# Patient Record
Sex: MALE | Race: WHITE | NOT HISPANIC OR LATINO | Employment: UNEMPLOYED | URBAN - NONMETROPOLITAN AREA
[De-identification: names, ages, dates, MRNs, and addresses within clinical notes are randomized per-mention and may not be internally consistent; named-entity substitution may affect disease eponyms.]

---

## 2020-11-24 ENCOUNTER — OFFICE VISIT (OUTPATIENT)
Dept: UROLOGY | Facility: CLINIC | Age: 30
End: 2020-11-24

## 2020-11-24 VITALS — WEIGHT: 164 LBS | BODY MASS INDEX: 23.48 KG/M2 | HEIGHT: 70 IN | TEMPERATURE: 98.1 F

## 2020-11-24 DIAGNOSIS — R53.83 LETHARGY: Primary | ICD-10-CM

## 2020-11-24 DIAGNOSIS — R68.82 LOSS OF LIBIDO: ICD-10-CM

## 2020-11-24 PROCEDURE — 83002 ASSAY OF GONADOTROPIN (LH): CPT | Performed by: UROLOGY

## 2020-11-24 PROCEDURE — 36415 COLL VENOUS BLD VENIPUNCTURE: CPT | Performed by: UROLOGY

## 2020-11-24 PROCEDURE — 80076 HEPATIC FUNCTION PANEL: CPT | Performed by: UROLOGY

## 2020-11-24 PROCEDURE — 84443 ASSAY THYROID STIM HORMONE: CPT | Performed by: UROLOGY

## 2020-11-24 PROCEDURE — 99203 OFFICE O/P NEW LOW 30 MIN: CPT | Performed by: UROLOGY

## 2020-11-24 PROCEDURE — 84403 ASSAY OF TOTAL TESTOSTERONE: CPT | Performed by: UROLOGY

## 2020-11-24 PROCEDURE — 84146 ASSAY OF PROLACTIN: CPT | Performed by: UROLOGY

## 2020-11-24 PROCEDURE — 85027 COMPLETE CBC AUTOMATED: CPT | Performed by: UROLOGY

## 2020-11-24 PROCEDURE — 83001 ASSAY OF GONADOTROPIN (FSH): CPT | Performed by: UROLOGY

## 2020-11-24 RX ORDER — BUPRENORPHINE HYDROCHLORIDE AND NALOXONE HYDROCHLORIDE DIHYDRATE 8; 2 MG/1; MG/1
1 TABLET SUBLINGUAL DAILY
COMMUNITY

## 2020-11-24 NOTE — PROGRESS NOTES
Chief Complaint:          Lethargy and loss of libido    HPI:   30 y.o. male.  Pt has 6 to 7 month history of emotional lability and loss of energy and loss of sex drive.  Pt does not have erectile dysfunction but he has difficulty having organism.    HPI      Past Medical History:      History reviewed. No pertinent past medical history.      Current Meds:     Current Outpatient Medications   Medication Sig Dispense Refill   • buprenorphine-naloxone (SUBOXONE) 8-2 MG per SL tablet Place 1 tablet under the tongue Daily.       No current facility-administered medications for this visit.         Allergies:      No Known Allergies     Past Surgical History:     Past Surgical History:   Procedure Laterality Date   • KNEE SURGERY Left    • TONSILLECTOMY           Social History:     Social History     Socioeconomic History   • Marital status: Single     Spouse name: Not on file   • Number of children: Not on file   • Years of education: Not on file   • Highest education level: Not on file   Tobacco Use   • Smoking status: Heavy Tobacco Smoker     Packs/day: 1.00     Types: Cigarettes   • Smokeless tobacco: Never Used   Substance and Sexual Activity   • Alcohol use: Yes   • Drug use: Not Currently   • Sexual activity: Defer       Family History:     Family History   Problem Relation Age of Onset   • No Known Problems Father    • No Known Problems Mother        Review of Systems:     Review of Systems   Constitutional: Positive for fatigue. Negative for chills and fever.   HENT: Negative for congestion, sinus pressure and sinus pain.    Eyes: Negative for pain and redness.   Respiratory: Negative for chest tightness and shortness of breath.    Cardiovascular: Negative for chest pain.   Gastrointestinal: Negative for abdominal pain, constipation, diarrhea, nausea and vomiting.   Genitourinary: Negative for dysuria, flank pain, frequency, hematuria, penile pain, scrotal swelling and urgency.   Musculoskeletal: Negative for  "back pain and neck pain.   Skin: Negative for rash.   Allergic/Immunologic: Negative for food allergies.   Neurological: Negative for dizziness and headaches.   Hematological: Does not bruise/bleed easily.   Psychiatric/Behavioral: The patient is nervous/anxious.        Physical Exam:     Physical Exam  Vitals signs and nursing note reviewed.   HENT:      Head: Normocephalic and atraumatic.      Right Ear: External ear normal.      Left Ear: External ear normal.      Nose: Nose normal.   Eyes:      Conjunctiva/sclera: Conjunctivae normal.      Pupils: Pupils are equal, round, and reactive to light.   Neck:      Musculoskeletal: Normal range of motion and neck supple.      Thyroid: No thyromegaly.   Cardiovascular:      Rate and Rhythm: Normal rate and regular rhythm.      Heart sounds: Normal heart sounds. No murmur.   Pulmonary:      Effort: Pulmonary effort is normal. No respiratory distress.      Breath sounds: Normal breath sounds. No wheezing or rales.   Chest:      Chest wall: No tenderness.   Abdominal:      General: Bowel sounds are normal. There is no distension.      Palpations: Abdomen is soft. There is no mass.      Tenderness: There is no abdominal tenderness.      Hernia: No hernia is present.   Genitourinary:     Penis: Normal.    Musculoskeletal: Normal range of motion.         General: No tenderness.   Lymphadenopathy:      Cervical: No cervical adenopathy.   Skin:     General: Skin is warm.      Findings: No rash.   Neurological:      Mental Status: He is alert and oriented to person, place, and time.      Cranial Nerves: No cranial nerve deficit.      Motor: No abnormal muscle tone.      Coordination: Coordination normal.   Psychiatric:         Behavior: Behavior normal.         Thought Content: Thought content normal.         Judgment: Judgment normal.         Temp 98.1 °F (36.7 °C)   Ht 177.8 cm (70\")   Wt 74.4 kg (164 lb)   BMI 23.53 kg/m²    Procedure:         Assessment:     Encounter " Diagnoses   Name Primary?   • Lethargy Yes   • Loss of libido        Orders Placed This Encounter   Procedures   • Testosterone   • Hepatic Function Panel     Standing Status:   Future     Standing Expiration Date:   11/24/2021   • Luteinizing Hormone     Standing Status:   Future     Standing Expiration Date:   11/24/2021   • Prolactin     Standing Status:   Future     Standing Expiration Date:   11/24/2021   • Follicle Stimulating Hormone     Standing Status:   Future     Standing Expiration Date:   11/24/2021   • TSH     Standing Status:   Future     Standing Expiration Date:   11/24/2021       Patient reports that he is not currently experiencing any symptoms of urinary incontinence.      Plan:   Will check pituitary gonadal axis return 1 month    Patient's Body mass index is 23.53 kg/m². BMI is within normal parameters. No follow-up required..      Smoking Cessation Counseling:  Former smoker.  Patient does not currently use any tobacco products.   Counseling was given to patient for the following topics impressions as follows: lethargy . The interim medical history and current results were reviewed.  A treatment plan with follow-up was made for Lethargy [R53.83]   This document has been electronically signed by Ted Yo MD November 24, 2020 11:52 EST

## 2020-11-25 ENCOUNTER — TELEPHONE (OUTPATIENT)
Dept: UROLOGY | Facility: CLINIC | Age: 30
End: 2020-11-25

## 2020-11-25 LAB
ALBUMIN SERPL-MCNC: 4.6 G/DL (ref 3.5–5.2)
ALP SERPL-CCNC: 77 U/L (ref 39–117)
ALT SERPL W P-5'-P-CCNC: 17 U/L (ref 1–41)
AST SERPL-CCNC: 25 U/L (ref 1–40)
BILIRUB CONJ SERPL-MCNC: <0.2 MG/DL (ref 0–0.3)
BILIRUB INDIRECT SERPL-MCNC: NORMAL MG/DL
BILIRUB SERPL-MCNC: 0.4 MG/DL (ref 0–1.2)
DEPRECATED RDW RBC AUTO: 41.9 FL (ref 37–54)
ERYTHROCYTE [DISTWIDTH] IN BLOOD BY AUTOMATED COUNT: 12.9 % (ref 12.3–15.4)
FSH SERPL-ACNC: 2.89 MIU/ML
HCT VFR BLD AUTO: 43.9 % (ref 37.5–51)
HGB BLD-MCNC: 15.1 G/DL (ref 13–17.7)
LH SERPL-ACNC: 1.6 MIU/ML
MCH RBC QN AUTO: 30.6 PG (ref 26.6–33)
MCHC RBC AUTO-ENTMCNC: 34.4 G/DL (ref 31.5–35.7)
MCV RBC AUTO: 88.9 FL (ref 79–97)
PLATELET # BLD AUTO: 136 10*3/MM3 (ref 140–450)
PMV BLD AUTO: 12.9 FL (ref 6–12)
PROLACTIN SERPL-MCNC: 8.76 NG/ML (ref 4.04–15.2)
PROT SERPL-MCNC: 7.2 G/DL (ref 6–8.5)
RBC # BLD AUTO: 4.94 10*6/MM3 (ref 4.14–5.8)
TESTOST SERPL-MCNC: 360 NG/DL (ref 249–836)
TSH SERPL DL<=0.05 MIU/L-ACNC: 2.2 UIU/ML (ref 0.27–4.2)
WBC # BLD AUTO: 5.61 10*3/MM3 (ref 3.4–10.8)

## 2020-11-25 NOTE — TELEPHONE ENCOUNTER
----- Message from Ted Yo MD sent at 11/25/2020  7:22 AM EST -----  Call pt and tell him that his testosterone is normal at 360,  Other labs are still pending  ----- Message -----  From: Lab, Background User  Sent: 11/25/2020   4:41 AM EST  To: Ted Yo MD

## 2020-12-28 ENCOUNTER — TELEPHONE (OUTPATIENT)
Dept: UROLOGY | Facility: CLINIC | Age: 30
End: 2020-12-28

## 2020-12-29 NOTE — TELEPHONE ENCOUNTER
Went over results with pt- all normal range - explained that is he is still having the sx of loss of libido and fatigue, that he needs to come in for an appt - had one yesterday that he no showed - transferred to [facility] to schedule.

## 2024-12-08 ENCOUNTER — HOSPITAL ENCOUNTER (INPATIENT)
Facility: HOSPITAL | Age: 34
LOS: 1 days | Discharge: HOME | DRG: 897 | End: 2024-12-09
Attending: PSYCHIATRY & NEUROLOGY | Admitting: PSYCHIATRY & NEUROLOGY
Payer: COMMERCIAL

## 2024-12-08 ENCOUNTER — HOSPITAL ENCOUNTER (EMERGENCY)
Facility: HOSPITAL | Age: 34
Discharge: PSYCHIATRIC OR SUBSTANCE USE FACILITY | DRG: 897 | End: 2024-12-08
Admitting: EMERGENCY MEDICINE
Payer: COMMERCIAL

## 2024-12-08 VITALS
BODY MASS INDEX: 21.05 KG/M2 | HEART RATE: 68 BPM | DIASTOLIC BLOOD PRESSURE: 82 MMHG | RESPIRATION RATE: 18 BRPM | SYSTOLIC BLOOD PRESSURE: 126 MMHG | OXYGEN SATURATION: 99 % | WEIGHT: 147 LBS | TEMPERATURE: 96.7 F | HEIGHT: 70 IN

## 2024-12-08 DIAGNOSIS — F19.10 SUBSTANCE ABUSE: Primary | ICD-10-CM

## 2024-12-08 PROBLEM — F19.20 CHEMICAL DEPENDENCY: Status: ACTIVE | Noted: 2024-12-08

## 2024-12-08 LAB
ALBUMIN SERPL-MCNC: 4.5 G/DL (ref 3.5–5.2)
ALBUMIN/GLOB SERPL: 1.6 G/DL
ALP SERPL-CCNC: 88 U/L (ref 39–117)
ALT SERPL W P-5'-P-CCNC: 32 U/L (ref 1–41)
AMPHET+METHAMPHET UR QL: NEGATIVE
AMPHETAMINES UR QL: NEGATIVE
ANION GAP SERPL CALCULATED.3IONS-SCNC: 10.2 MMOL/L (ref 5–15)
AST SERPL-CCNC: 33 U/L (ref 1–40)
BARBITURATES UR QL SCN: NEGATIVE
BASOPHILS # BLD AUTO: 0.03 10*3/MM3 (ref 0–0.2)
BASOPHILS NFR BLD AUTO: 0.3 % (ref 0–1.5)
BENZODIAZ UR QL SCN: NEGATIVE
BILIRUB SERPL-MCNC: <0.2 MG/DL (ref 0–1.2)
BILIRUB UR QL STRIP: NEGATIVE
BUN SERPL-MCNC: 19 MG/DL (ref 6–20)
BUN/CREAT SERPL: 22.9 (ref 7–25)
BUPRENORPHINE SERPL-MCNC: NEGATIVE NG/ML
CALCIUM SPEC-SCNC: 9.7 MG/DL (ref 8.6–10.5)
CANNABINOIDS SERPL QL: POSITIVE
CHLORIDE SERPL-SCNC: 106 MMOL/L (ref 98–107)
CLARITY UR: ABNORMAL
CO2 SERPL-SCNC: 26.8 MMOL/L (ref 22–29)
COCAINE UR QL: NEGATIVE
COLOR UR: YELLOW
CREAT SERPL-MCNC: 0.83 MG/DL (ref 0.76–1.27)
DEPRECATED RDW RBC AUTO: 42.6 FL (ref 37–54)
EGFRCR SERPLBLD CKD-EPI 2021: 117.1 ML/MIN/1.73
EOSINOPHIL # BLD AUTO: 0.24 10*3/MM3 (ref 0–0.4)
EOSINOPHIL NFR BLD AUTO: 2.7 % (ref 0.3–6.2)
ERYTHROCYTE [DISTWIDTH] IN BLOOD BY AUTOMATED COUNT: 12.3 % (ref 12.3–15.4)
ETHANOL BLD-MCNC: <10 MG/DL (ref 0–10)
ETHANOL UR QL: <0.01 %
FENTANYL UR-MCNC: NEGATIVE NG/ML
GLOBULIN UR ELPH-MCNC: 2.9 GM/DL
GLUCOSE SERPL-MCNC: 98 MG/DL (ref 65–99)
GLUCOSE UR STRIP-MCNC: NEGATIVE MG/DL
HCT VFR BLD AUTO: 43.2 % (ref 37.5–51)
HGB BLD-MCNC: 14.1 G/DL (ref 13–17.7)
HGB UR QL STRIP.AUTO: NEGATIVE
IMM GRANULOCYTES # BLD AUTO: 0.01 10*3/MM3 (ref 0–0.05)
IMM GRANULOCYTES NFR BLD AUTO: 0.1 % (ref 0–0.5)
KETONES UR QL STRIP: NEGATIVE
LEUKOCYTE ESTERASE UR QL STRIP.AUTO: NEGATIVE
LYMPHOCYTES # BLD AUTO: 2.83 10*3/MM3 (ref 0.7–3.1)
LYMPHOCYTES NFR BLD AUTO: 32.3 % (ref 19.6–45.3)
MAGNESIUM SERPL-MCNC: 2 MG/DL (ref 1.6–2.6)
MCH RBC QN AUTO: 30.9 PG (ref 26.6–33)
MCHC RBC AUTO-ENTMCNC: 32.6 G/DL (ref 31.5–35.7)
MCV RBC AUTO: 94.7 FL (ref 79–97)
METHADONE UR QL SCN: NEGATIVE
MONOCYTES # BLD AUTO: 0.86 10*3/MM3 (ref 0.1–0.9)
MONOCYTES NFR BLD AUTO: 9.8 % (ref 5–12)
NEUTROPHILS NFR BLD AUTO: 4.78 10*3/MM3 (ref 1.7–7)
NEUTROPHILS NFR BLD AUTO: 54.8 % (ref 42.7–76)
NITRITE UR QL STRIP: NEGATIVE
NRBC BLD AUTO-RTO: 0 /100 WBC (ref 0–0.2)
OPIATES UR QL: NEGATIVE
OXYCODONE UR QL SCN: NEGATIVE
PCP UR QL SCN: NEGATIVE
PH UR STRIP.AUTO: 6.5 [PH] (ref 5–8)
PLATELET # BLD AUTO: 209 10*3/MM3 (ref 140–450)
PMV BLD AUTO: 10.7 FL (ref 6–12)
POTASSIUM SERPL-SCNC: 4.1 MMOL/L (ref 3.5–5.2)
PROT SERPL-MCNC: 7.4 G/DL (ref 6–8.5)
PROT UR QL STRIP: ABNORMAL
RBC # BLD AUTO: 4.56 10*6/MM3 (ref 4.14–5.8)
SODIUM SERPL-SCNC: 143 MMOL/L (ref 136–145)
SP GR UR STRIP: 1.02 (ref 1–1.03)
TRICYCLICS UR QL SCN: NEGATIVE
UROBILINOGEN UR QL STRIP: ABNORMAL
WBC NRBC COR # BLD AUTO: 8.75 10*3/MM3 (ref 3.4–10.8)

## 2024-12-08 PROCEDURE — 80053 COMPREHEN METABOLIC PANEL: CPT | Performed by: PHYSICIAN ASSISTANT

## 2024-12-08 PROCEDURE — 36415 COLL VENOUS BLD VENIPUNCTURE: CPT

## 2024-12-08 PROCEDURE — 85025 COMPLETE CBC W/AUTO DIFF WBC: CPT | Performed by: PHYSICIAN ASSISTANT

## 2024-12-08 PROCEDURE — 93005 ELECTROCARDIOGRAM TRACING: CPT | Performed by: EMERGENCY MEDICINE

## 2024-12-08 PROCEDURE — 81003 URINALYSIS AUTO W/O SCOPE: CPT | Performed by: PHYSICIAN ASSISTANT

## 2024-12-08 PROCEDURE — 99285 EMERGENCY DEPT VISIT HI MDM: CPT

## 2024-12-08 PROCEDURE — 83735 ASSAY OF MAGNESIUM: CPT | Performed by: PHYSICIAN ASSISTANT

## 2024-12-08 PROCEDURE — 80307 DRUG TEST PRSMV CHEM ANLYZR: CPT | Performed by: PHYSICIAN ASSISTANT

## 2024-12-08 PROCEDURE — 82077 ASSAY SPEC XCP UR&BREATH IA: CPT | Performed by: PHYSICIAN ASSISTANT

## 2024-12-08 PROCEDURE — HZ2ZZZZ DETOXIFICATION SERVICES FOR SUBSTANCE ABUSE TREATMENT: ICD-10-PCS | Performed by: PSYCHIATRY & NEUROLOGY

## 2024-12-08 PROCEDURE — 93010 ELECTROCARDIOGRAM REPORT: CPT | Performed by: INTERNAL MEDICINE

## 2024-12-08 RX ORDER — POLYETHYLENE GLYCOL 3350 17 G/17G
17 POWDER, FOR SOLUTION ORAL DAILY PRN
Status: DISCONTINUED | OUTPATIENT
Start: 2024-12-08 | End: 2024-12-09 | Stop reason: HOSPADM

## 2024-12-08 RX ORDER — CLONIDINE HYDROCHLORIDE 0.1 MG/1
0.1 TABLET ORAL 2 TIMES DAILY PRN
Status: DISCONTINUED | OUTPATIENT
Start: 2024-12-11 | End: 2024-12-09 | Stop reason: HOSPADM

## 2024-12-08 RX ORDER — ALUMINA, MAGNESIA, AND SIMETHICONE 2400; 2400; 240 MG/30ML; MG/30ML; MG/30ML
15 SUSPENSION ORAL EVERY 6 HOURS PRN
Status: DISCONTINUED | OUTPATIENT
Start: 2024-12-08 | End: 2024-12-09 | Stop reason: HOSPADM

## 2024-12-08 RX ORDER — CLONIDINE HYDROCHLORIDE 0.1 MG/1
0.1 TABLET ORAL 3 TIMES DAILY PRN
Status: DISCONTINUED | OUTPATIENT
Start: 2024-12-10 | End: 2024-12-09 | Stop reason: HOSPADM

## 2024-12-08 RX ORDER — ECHINACEA PURPUREA EXTRACT 125 MG
2 TABLET ORAL AS NEEDED
Status: DISCONTINUED | OUTPATIENT
Start: 2024-12-08 | End: 2024-12-09 | Stop reason: HOSPADM

## 2024-12-08 RX ORDER — HYDRALAZINE HYDROCHLORIDE 25 MG/1
25 TABLET, FILM COATED ORAL DAILY PRN
Status: DISCONTINUED | OUTPATIENT
Start: 2024-12-08 | End: 2024-12-09 | Stop reason: HOSPADM

## 2024-12-08 RX ORDER — HYDROXYZINE HYDROCHLORIDE 50 MG/1
50 TABLET, FILM COATED ORAL EVERY 6 HOURS PRN
Status: DISCONTINUED | OUTPATIENT
Start: 2024-12-08 | End: 2024-12-09 | Stop reason: HOSPADM

## 2024-12-08 RX ORDER — BENZTROPINE MESYLATE 1 MG/ML
1 INJECTION, SOLUTION INTRAMUSCULAR; INTRAVENOUS ONCE AS NEEDED
Status: DISCONTINUED | OUTPATIENT
Start: 2024-12-08 | End: 2024-12-09 | Stop reason: HOSPADM

## 2024-12-08 RX ORDER — CLONIDINE HYDROCHLORIDE 0.1 MG/1
0.1 TABLET ORAL 4 TIMES DAILY PRN
Status: DISCONTINUED | OUTPATIENT
Start: 2024-12-09 | End: 2024-12-09 | Stop reason: HOSPADM

## 2024-12-08 RX ORDER — OLANZAPINE 5 MG/1
2.5 TABLET ORAL ONCE
Status: DISCONTINUED | OUTPATIENT
Start: 2024-12-08 | End: 2024-12-09 | Stop reason: HOSPADM

## 2024-12-08 RX ORDER — ONDANSETRON 4 MG/1
4 TABLET, ORALLY DISINTEGRATING ORAL EVERY 6 HOURS PRN
Status: DISCONTINUED | OUTPATIENT
Start: 2024-12-08 | End: 2024-12-09 | Stop reason: HOSPADM

## 2024-12-08 RX ORDER — IBUPROFEN 400 MG/1
400 TABLET, FILM COATED ORAL EVERY 6 HOURS PRN
Status: DISCONTINUED | OUTPATIENT
Start: 2024-12-08 | End: 2024-12-09 | Stop reason: HOSPADM

## 2024-12-08 RX ORDER — FAMOTIDINE 20 MG/1
20 TABLET, FILM COATED ORAL 2 TIMES DAILY PRN
Status: DISCONTINUED | OUTPATIENT
Start: 2024-12-08 | End: 2024-12-09 | Stop reason: HOSPADM

## 2024-12-08 RX ORDER — CYCLOBENZAPRINE HCL 10 MG
10 TABLET ORAL 3 TIMES DAILY PRN
Status: DISCONTINUED | OUTPATIENT
Start: 2024-12-08 | End: 2024-12-09 | Stop reason: HOSPADM

## 2024-12-08 RX ORDER — LOPERAMIDE HYDROCHLORIDE 2 MG/1
2 CAPSULE ORAL
Status: DISCONTINUED | OUTPATIENT
Start: 2024-12-08 | End: 2024-12-09 | Stop reason: HOSPADM

## 2024-12-08 RX ORDER — TRAZODONE HYDROCHLORIDE 50 MG/1
50 TABLET, FILM COATED ORAL NIGHTLY PRN
Status: DISCONTINUED | OUTPATIENT
Start: 2024-12-08 | End: 2024-12-09 | Stop reason: HOSPADM

## 2024-12-08 RX ORDER — DICYCLOMINE HYDROCHLORIDE 10 MG/1
10 CAPSULE ORAL 3 TIMES DAILY PRN
Status: DISCONTINUED | OUTPATIENT
Start: 2024-12-08 | End: 2024-12-09 | Stop reason: HOSPADM

## 2024-12-08 RX ORDER — BENZTROPINE MESYLATE 1 MG/1
2 TABLET ORAL ONCE AS NEEDED
Status: DISCONTINUED | OUTPATIENT
Start: 2024-12-08 | End: 2024-12-09 | Stop reason: HOSPADM

## 2024-12-08 RX ORDER — CLONIDINE HYDROCHLORIDE 0.1 MG/1
0.1 TABLET ORAL ONCE AS NEEDED
Status: DISCONTINUED | OUTPATIENT
Start: 2024-12-12 | End: 2024-12-09 | Stop reason: HOSPADM

## 2024-12-08 RX ORDER — ACETAMINOPHEN 325 MG/1
650 TABLET ORAL EVERY 6 HOURS PRN
Status: DISCONTINUED | OUTPATIENT
Start: 2024-12-08 | End: 2024-12-09 | Stop reason: HOSPADM

## 2024-12-08 RX ORDER — CLONIDINE HYDROCHLORIDE 0.1 MG/1
0.1 TABLET ORAL 4 TIMES DAILY PRN
Status: ACTIVE | OUTPATIENT
Start: 2024-12-08 | End: 2024-12-09

## 2024-12-08 RX ORDER — NICOTINE 21 MG/24HR
1 PATCH, TRANSDERMAL 24 HOURS TRANSDERMAL
Status: DISCONTINUED | OUTPATIENT
Start: 2024-12-08 | End: 2024-12-09 | Stop reason: HOSPADM

## 2024-12-08 RX ORDER — BUPRENORPHINE AND NALOXONE 8; 2 MG/1; MG/1
0.12 FILM, SOLUBLE BUCCAL; SUBLINGUAL DAILY
COMMUNITY
End: 2024-12-09 | Stop reason: HOSPADM

## 2024-12-08 RX ORDER — BENZONATATE 100 MG/1
100 CAPSULE ORAL 3 TIMES DAILY PRN
Status: DISCONTINUED | OUTPATIENT
Start: 2024-12-08 | End: 2024-12-09 | Stop reason: HOSPADM

## 2024-12-08 RX ADMIN — TRAZODONE HYDROCHLORIDE 50 MG: 50 TABLET ORAL at 22:56

## 2024-12-08 RX ADMIN — HYDROXYZINE HYDROCHLORIDE 50 MG: 50 TABLET, FILM COATED ORAL at 22:58

## 2024-12-09 VITALS
WEIGHT: 143 LBS | HEART RATE: 61 BPM | RESPIRATION RATE: 18 BRPM | BODY MASS INDEX: 20.47 KG/M2 | HEIGHT: 70 IN | SYSTOLIC BLOOD PRESSURE: 133 MMHG | OXYGEN SATURATION: 98 % | TEMPERATURE: 98.6 F | DIASTOLIC BLOOD PRESSURE: 74 MMHG

## 2024-12-09 PROBLEM — F11.20 OPIOID USE DISORDER, SEVERE, DEPENDENCE: Status: ACTIVE | Noted: 2024-12-08

## 2024-12-09 LAB
HAV IGM SERPL QL IA: NORMAL
HBV CORE IGM SERPL QL IA: NORMAL
HBV SURFACE AG SERPL QL IA: NORMAL
HCV AB SER QL: NORMAL
QT INTERVAL: 384 MS
QTC INTERVAL: 399 MS

## 2024-12-09 PROCEDURE — 80074 ACUTE HEPATITIS PANEL: CPT | Performed by: PSYCHIATRY & NEUROLOGY

## 2024-12-09 PROCEDURE — 99235 HOSP IP/OBS SAME DATE MOD 70: CPT | Performed by: PSYCHIATRY & NEUROLOGY

## 2024-12-09 NOTE — ED PROVIDER NOTES
Subjective   History of Present Illness  34-year-old male presents secondary to need for detox from Suboxone.  Patient states he has been using this for a while.  He denies any suicidal homicidal ideation.  He denies any AV hallucinations.  He has no past medical problems.  He presents by private vehicle.      Review of Systems   Constitutional: Negative.  Negative for fever.   HENT: Negative.     Respiratory: Negative.     Cardiovascular: Negative.  Negative for chest pain.   Gastrointestinal: Negative.  Negative for abdominal pain.   Endocrine: Negative.    Genitourinary: Negative.  Negative for dysuria.   Skin: Negative.    Neurological: Negative.    Psychiatric/Behavioral: Negative.  Negative for suicidal ideas.    All other systems reviewed and are negative.      No past medical history on file.    No Known Allergies    Past Surgical History:   Procedure Laterality Date   • KNEE SURGERY Left    • TONSILLECTOMY         Family History   Problem Relation Age of Onset   • No Known Problems Father    • No Known Problems Mother        Social History     Socioeconomic History   • Marital status: Single   Tobacco Use   • Smoking status: Heavy Smoker     Current packs/day: 1.00     Types: Cigarettes   • Smokeless tobacco: Never   Substance and Sexual Activity   • Alcohol use: Yes   • Drug use: Not Currently   • Sexual activity: Defer           Objective   Physical Exam  Vitals and nursing note reviewed.   Constitutional:       General: He is not in acute distress.     Appearance: He is well-developed. He is not diaphoretic.   HENT:      Head: Normocephalic and atraumatic.      Right Ear: External ear normal.      Left Ear: External ear normal.      Nose: Nose normal.   Eyes:      Conjunctiva/sclera: Conjunctivae normal.      Pupils: Pupils are equal, round, and reactive to light.   Neck:      Vascular: No JVD.      Trachea: No tracheal deviation.   Cardiovascular:      Rate and Rhythm: Normal rate and regular rhythm.       Heart sounds: Normal heart sounds. No murmur heard.  Pulmonary:      Effort: Pulmonary effort is normal. No respiratory distress.      Breath sounds: Normal breath sounds. No wheezing.   Abdominal:      General: Bowel sounds are normal.      Palpations: Abdomen is soft.      Tenderness: There is no abdominal tenderness.   Musculoskeletal:         General: No deformity. Normal range of motion.      Cervical back: Normal range of motion and neck supple.   Skin:     General: Skin is warm and dry.      Coloration: Skin is not pale.      Findings: No erythema or rash.   Neurological:      Mental Status: He is alert and oriented to person, place, and time.      Cranial Nerves: No cranial nerve deficit.   Psychiatric:         Behavior: Behavior normal.         Thought Content: Thought content normal. Thought content does not include homicidal or suicidal ideation.         Procedures           ED Course  ED Course as of 12/16/24 0857   Sun Dec 08, 2024   2211 EKG was obtained reveals s sinus rhythm with a ventricular rate of 65, no acute ST change signify ischemia, right axis, ventricular rate 65  QRS 82 , QTc 399  Electronically signed by John Jackson MD, 12/08/24, 10:11 PM EST.   [AM]      ED Course User Index  [AM] John Jackson MD                                                       Medical Decision Making  34-year-old male presents secondary to need for detox from Suboxone.  Patient states he has been using this for a while.  He denies any suicidal homicidal ideation.  He denies any AV hallucinations.  He has no past medical problems.  He presents by private vehicle.    Problems Addressed:  Substance abuse: complicated acute illness or injury    Amount and/or Complexity of Data Reviewed  Labs: ordered. Decision-making details documented in ED Course.        Final diagnoses:   Substance abuse       ED Disposition  ED Disposition       ED Disposition   DC/Transfer to Behavioral Health     Condition   Stable    Comment   --               No follow-up provider specified.       Medication List      No changes were made to your prescriptions during this visit.            Demetris Hsu PA  12/08/24 1921       Demetris Hsu PA  12/16/24 0857

## 2024-12-09 NOTE — DISCHARGE SUMMARY
":  1990  MRN:  [id number]  Visit Number:  [id number]      Date of Admission:2024   Date of Discharge:  2024    Discharge Diagnosis:  Principal Problem:    Opioid use disorder, severe, dependence        Admission Diagnosis:  Chemical dependency [F19.20]     HENRY Mendez is a 34 y.o. male who was admitted on 2024 with complaints of opioid use and withdrawals.   For details please see H&P dated 5 history of abuse and withdrawals.  He was admitted to the detox unit.  2024    Hospital Course  Patient is a 34 y.o. male presented with patient was started on clonidine detox.  He reported that he had already tried to wean himself off of the Suboxone was started just 1 mg a day dose.  Patient was not experiencing any active withdrawals and reports that he would get uncomfortable at night.  Patient's condition did not merit an active detox treatment and he was encouraged to go to rehab treatment and he was agreeable and was then discharged.    Mental Status Exam upon discharge:   Mood \"better\"   Affect-congruent, appropriate, stable  Thought Content-goal directed, no delusional material present  Thought process-linear, organized.  Suicidality: No SI  Homicidality: No HI  Perception: No AH/VH    Procedures Performed         Consults:   Consults       No orders found for last 30 day(s).            Pertinent Test Results:   Admission on 2024   Component Date Value Ref Range Status    Hepatitis B Surface Ag 2024 Non-Reactive  Non-Reactive Final    Hep A IgM 2024 Non-Reactive  Non-Reactive Final    Hep B C IgM 2024 Non-Reactive  Non-Reactive Final    Hepatitis C Ab 2024 Non-Reactive  Non-Reactive Final   Admission on 2024, Discharged on 2024   Component Date Value Ref Range Status    Glucose 2024 98  65 - 99 mg/dL Final    BUN 2024 19  6 - 20 mg/dL Final    Creatinine 2024 0.83  0.76 - 1.27 mg/dL Final    Sodium 2024 143 "  136 - 145 mmol/L Final    Potassium 12/08/2024 4.1  3.5 - 5.2 mmol/L Final    Chloride 12/08/2024 106  98 - 107 mmol/L Final    CO2 12/08/2024 26.8  22.0 - 29.0 mmol/L Final    Calcium 12/08/2024 9.7  8.6 - 10.5 mg/dL Final    Total Protein 12/08/2024 7.4  6.0 - 8.5 g/dL Final    Albumin 12/08/2024 4.5  3.5 - 5.2 g/dL Final    ALT (SGPT) 12/08/2024 32  1 - 41 U/L Final    AST (SGOT) 12/08/2024 33  1 - 40 U/L Final    Alkaline Phosphatase 12/08/2024 88  39 - 117 U/L Final    Total Bilirubin 12/08/2024 <0.2  0.0 - 1.2 mg/dL Final    Globulin 12/08/2024 2.9  gm/dL Final    A/G Ratio 12/08/2024 1.6  g/dL Final    BUN/Creatinine Ratio 12/08/2024 22.9  7.0 - 25.0 Final    Anion Gap 12/08/2024 10.2  5.0 - 15.0 mmol/L Final    eGFR 12/08/2024 117.1  >60.0 mL/min/1.73 Final    Color, UA 12/08/2024 Yellow  Yellow, Straw Final    Appearance, UA 12/08/2024 Cloudy (A)  Clear Final    pH, UA 12/08/2024 6.5  5.0 - 8.0 Final    Specific [place], UA 12/08/2024 1.024  1.005 - 1.030 Final    Glucose, UA 12/08/2024 Negative  Negative Final    Ketones, UA 12/08/2024 Negative  Negative Final    Bilirubin, UA 12/08/2024 Negative  Negative Final    Blood, UA 12/08/2024 Negative  Negative Final    Protein, UA 12/08/2024 Trace (A)  Negative Final    Leuk Esterase, UA 12/08/2024 Negative  Negative Final    Nitrite, UA 12/08/2024 Negative  Negative Final    Urobilinogen, UA 12/08/2024 0.2 E.U./dL  0.2 - 1.0 E.U./dL Final    Ethanol 12/08/2024 <10  0 - 10 mg/dL Final    Ethanol % 12/08/2024 <0.010  % Final    THC, Screen, Urine 12/08/2024 Positive (A)  Negative Final    Phencyclidine (PCP), Urine 12/08/2024 Negative  Negative Final    Cocaine Screen, Urine 12/08/2024 Negative  Negative Final    Methamphetamine, Ur 12/08/2024 Negative  Negative Final    Opiate Screen 12/08/2024 Negative  Negative Final    Amphetamine Screen, Urine 12/08/2024 Negative  Negative Final    Benzodiazepine Screen, Urine 12/08/2024 Negative  Negative Final     Tricyclic Antidepressants Screen 12/08/2024 Negative  Negative Final    Methadone Screen, Urine 12/08/2024 Negative  Negative Final    Barbiturates Screen, Urine 12/08/2024 Negative  Negative Final    Oxycodone Screen, Urine 12/08/2024 Negative  Negative Final    Buprenorphine, Screen, Urine 12/08/2024 Negative  Negative Final    Magnesium 12/08/2024 2.0  1.6 - 2.6 mg/dL Final    WBC 12/08/2024 8.75  3.40 - 10.80 10*3/mm3 Final    RBC 12/08/2024 4.56  4.14 - 5.80 10*6/mm3 Final    Hemoglobin 12/08/2024 14.1  13.0 - 17.7 g/dL Final    Hematocrit 12/08/2024 43.2  37.5 - 51.0 % Final    MCV 12/08/2024 94.7  79.0 - 97.0 fL Final    MCH 12/08/2024 30.9  26.6 - 33.0 pg Final    MCHC 12/08/2024 32.6  31.5 - 35.7 g/dL Final    RDW 12/08/2024 12.3  12.3 - 15.4 % Final    RDW-SD 12/08/2024 42.6  37.0 - 54.0 fl Final    MPV 12/08/2024 10.7  6.0 - 12.0 fL Final    Platelets 12/08/2024 209  140 - 450 10*3/mm3 Final    Neutrophil % 12/08/2024 54.8  42.7 - 76.0 % Final    Lymphocyte % 12/08/2024 32.3  19.6 - 45.3 % Final    Monocyte % 12/08/2024 9.8  5.0 - 12.0 % Final    Eosinophil % 12/08/2024 2.7  0.3 - 6.2 % Final    Basophil % 12/08/2024 0.3  0.0 - 1.5 % Final    Immature Grans % 12/08/2024 0.1  0.0 - 0.5 % Final    Neutrophils, Absolute 12/08/2024 4.78  1.70 - 7.00 10*3/mm3 Final    Lymphocytes, Absolute 12/08/2024 2.83  0.70 - 3.10 10*3/mm3 Final    Monocytes, Absolute 12/08/2024 0.86  0.10 - 0.90 10*3/mm3 Final    Eosinophils, Absolute 12/08/2024 0.24  0.00 - 0.40 10*3/mm3 Final    Basophils, Absolute 12/08/2024 0.03  0.00 - 0.20 10*3/mm3 Final    Immature Grans, Absolute 12/08/2024 0.01  0.00 - 0.05 10*3/mm3 Final    nRBC 12/08/2024 0.0  0.0 - 0.2 /100 WBC Final    Fentanyl, Urine 12/08/2024 Negative  Negative Final    QT Interval 12/08/2024 384  ms Preliminary    QTC Interval 12/08/2024 399  ms Preliminary        Condition on Discharge:  improved    Vital Signs  Temp:  [96.7 °F (35.9 °C)-98.7 °F (37.1 °C)] 98.7 °F (37.1  °C)  Heart Rate:  [68-81] 81  Resp:  [16-20] 18  BP: (117-131)/(60-82) 122/78      Discharge Disposition:  Rehab Facility or Unit (DC - External)    Discharge Medications:     Discharge Medications        Stop These Medications      Suboxone 8-2 MG film film  Generic drug: buprenorphine-naloxone              Discharge Diet: Regular     Activity at Discharge: As tolerated     Follow-up Appointments  Residential Rehab, patient reported he would set it up himself.       Time: I spent  >30  minutes on this discharge activity which included: face-to-face encounter with the patient, reviewing the data in the system, coordination of the care with the nursing staff as well as consultants, documentation, and entering orders.        Clinician:   Mrayellen Dee MD  12/09/24  11:14 EST

## 2024-12-09 NOTE — NURSING NOTE
[facility] RN contacted at this time for chart review and request of bed assignment. Bed assigned to 43B    EKG completed and reviewed by Dr. Jackson. Pt cleared medically for transport to the Unit.   Other (specify)/Add Cons. CHO to diet order

## 2024-12-09 NOTE — PROGRESS NOTES
Behavioral Health Discharge Summary             Please fax within 24 hours of discharge to [facility] at: [phone]      Member Name: Aubrey Mendez Member ID: [id number]   Authorization Number: [id number] Phone: [phone]   [facility] [place] [address]   Discharge Date: 12/09/2024 Level of Care at Discharge: Stable   Facility: [facility] Staff Completing Form: Kaity MERRITT   If the member is being discharged directly to a residential or extended care program, please specify the type below.   __Private Child-Caring Facility (PCC) Residential/Group Home   __Private Child-Caring Facility (PCC) Therapeutic Foster Care   __Residential Treatment Facility (RTF)   __Psychiatric Residential Treatment Facility (PRTF I or II)   __Long-Term Acute Inpatient Hospital Services or Extended Care Unit (ECU)   __Other (please specify):    Brief discharge summary of treatment received (for follow up by the case management team): D/C clinical with list of medications and follow up appts given to patient upon discharge.     BRIEF SUMMARY OF RECOMMENDATIONS FOR ONGOING TREATMENT     Discharged to where: home   Discharge diagnoses: F 11.20   Axis I:    Axis II:    Axis III:    Axis IV:    Axis V:    Does the member understand his/her DX?  Yes          Medication     Dose     Schedule Supply/  Quantity  Given at Discharge RX Provided  Yes/No  If Rx Provided, Quantity RX Prior Auth Required  Yes/No Prior Auth  Completed   NO MEDICATION WAS ORDERED AT DISCHARGE                                                                                          Does the member understand the reason for taking these medications? Yes                                                           FOLLOW-UP APPOINTMENTS   Please schedule within 7 days of discharge and provide appointment details for all referred services.    PCP/Other Providers Involved in Treatment:    Appointment Type: NO  AFTER CARE APPT PER THE PATIENT REQUEST Provider Name:  Provider Phone:  Appointment Date:  Appointment Time:      Assessment   (new to OP services)        Case Management    Is the member already enrolled in case management?  Yes/No  If yes, date the CM was notified:    If no, was the CM referral offered?  Yes/No  Accepted? Yes/No    Is the Release of Information in the chart? Yes/No:      Medication Management (for member discharged with psychiatric medications):      A&D Treatment (for member with substance abuse/   dependence in the past year):      Medical Condition (for member with a medical condition):    Other recommended treatment:    Do you have any concerns about the discharge plan?  No    If yes, explain:    Was the member involved in the discharge planning?  Yes    If no, explain:    Was a copy of the discharge plan provided to the member?  Yes    If no, explain:

## 2024-12-09 NOTE — PLAN OF CARE
"Goal Outcome Evaluation:  Plan of Care Reviewed With: patient  Plan of Care Reviewed With: patient  Patient Agreement with Plan of Care: agrees     Progress: no change  Outcome Evaluation: Patient reports he came in to detox off of Suboxone. Pt states, \"I've been taking about 1/8 a day for the last few weeks trying to taper myself completely off.\" Patient denies any other drug or alcohol abuse, but was positive for THC.  Patient rates anxiety 6/10. Denies SI/HI/AVH. Appetite-good. Sleep-poor. COWS-2. CIWA-2.                             "

## 2024-12-09 NOTE — THERAPY DISCHARGE NOTE
Patient Name:  Aurbey Mendez  YOB: 1990  MRN: [id number]  Admit Date:  12/8/2024    Patient requested discharge on this date and left the premises before this Therapist could complete assessment.     Electronically signed by:  NBA Amin  12/09/24 13:17 EST

## 2024-12-09 NOTE — NURSING NOTE
Spoke to Dr. Hopkins via phone. Intake information provided. Instructed to admit the patient. Admit orders received. SP4 routine orders, olanzapine 2.5mg PO one time dose, clonidine detox protocol RBTOx2. Patient and ED provider made aware of plan of care. Safety precautions maintained. EKG ordered per current admission protocol.

## 2024-12-09 NOTE — NURSING NOTE
"Intake assessment completed at this time. Pt presents to Intake wishing to stop taking prescribed Suboxone. Pt states, \"I have been tapering myself off the Suboxone because the clinic won't take me off of the Suboxone. I'm tired of taking it and I don't want to take it anymore.\" Pt reports he has only been taking an 8th of a strip daily for the last few weeks and wishes to stop taking altogether, but the clinic is trying to get him to switch to the shot instead of quitting. Pt denies any Psych or rehab Hx. Pt reports occasional ETOH and 2 beers yesterday at a restaurant, denies any drug use. Pt cheerful, rambling and hyper verbal during assessment and with other patients in Intake, evasive with some questions, answering with \"no\" before the question completed.    Labs  UDS + THC    Anxiety 7 depression 0 craving 0 on scale of 0-10. Sleep poor appetite ok.  Pt denying any SI/HI/AVH.    COWS 5  CIWA 5    Pt A&Ox 3.  "

## 2024-12-09 NOTE — H&P
INITIAL PSYCHIATRIC HISTORY & PHYSICAL    Patient Identification:  Name:   Aubrey Mendez  Age:   34 y.o.  Sex:   male  :   1990  MRN:   [id number]  Visit Number:   [id number]  Primary Care Physician:   Provider, No Known    SUBJECTIVE    CC/Focus of Exam: Detox    HPI: Aubrey Mendez is a 34 y.o. male who was admitted on 2024 with complaints of opioid use and withdrawals. The patient reports a long history of substance use. First use was as a teenager when he was prescribed pain pills after left leg surgery. Over time the use increased and the patient  continued to use despite negative consequences including relationship problems, social and financial problems. The patient endorses symptoms of tolerance and withdrawals and ongoing cravings to use. Has tried to cut down and stop but has not been successful. Spends too much time and resources in pursuit of substance use. Longest period of sobriety is reported to be 2 months.  Currently patient states he has been trying to wean himself off of Suboxone and was taking 1 mg of Suboxone for the last six months.   Last use 2024  Withdrawal symptoms patient denies any  Patient denies any substance abuse but states he is prescribed Suboxone.  Patient states he would like to detox off of his Suboxone.  Patient states that he uses tobacco.  Patient denies any history of seizures with withdrawal.  Patient states life as a stressor in his life.  Patient denies any history of physical, mental, or sexual abuse.  Patient rates his appetite as fair.  Patient rates his sleep as poor.  Patient denies any nightmares.  Patient denies any anxiety.  Patient denies any depression.  Patient denies any cravings.  Patient denies any suicidal ideation.  Patient denies any homicidal ideation.  Patient denies any hallucinations.  Patient was admitted to [facility] psychiatry for further safety and stabilization.    Available medical/psychiatric records  reviewed and incorporated into the current document.     PAST PSYCHIATRIC HX: Patient has had no prior admissions.  Patient denies any outpatient care.    SUBSTANCE USE HX: UDS was positive for THC.  Patient reports he drinks beer occasionally and denies any problems with alcohol use.     SOCIAL HX: Patient states he was born and raised in [place] [facility].  Patient states he currently resides by himself in [facility].  Patient states he is single and has no children.  Patient states he is currently unemployed.  Patient states he has some college education.  Patient denies any legal issues.    Past Medical History:   Diagnosis Date    Substance abuse     Withdrawal symptoms, drug or narcotic        Past Surgical History:   Procedure Laterality Date    KNEE SURGERY Left     TONSILLECTOMY         Family History   Problem Relation Age of Onset    No Known Problems Father     No Known Problems Mother          Medications Prior to Admission   Medication Sig Dispense Refill Last Dose/Taking    buprenorphine-naloxone (Suboxone) 8-2 MG film film Place 0.125 films under the tongue Daily.   12/8/2024 Morning           ALLERGIES:  Patient has no known allergies.    Temp:  [96.7 °F (35.9 °C)-98.7 °F (37.1 °C)] 98.7 °F (37.1 °C)  Heart Rate:  [68-81] 81  Resp:  [16-20] 18  BP: (117-131)/(60-82) 122/78    REVIEW OF SYSTEMS:  Review of Systems   Constitutional:  Positive for fatigue.   HENT: Negative.     Eyes: Negative.    Respiratory: Negative.     Cardiovascular: Negative.    Gastrointestinal: Negative.    Endocrine: Negative.    Genitourinary: Negative.    Musculoskeletal:  Positive for myalgias.   Skin: Negative.    Allergic/Immunologic: Negative.    Neurological: Negative.    Hematological: Negative.    Psychiatric/Behavioral:  Positive for dysphoric mood. The patient is nervous/anxious.         OBJECTIVE    PHYSICAL EXAM:  Physical Exam  Constitutional:  Appears well-developed and well-nourished.   HENT:   Head:  Normocephalic and atraumatic.   Right Ear: External ear normal.   Left Ear: External ear normal.   Mouth/Throat: Oropharynx is clear and moist.   Eyes: Pupils are equal, round, and reactive to light. Conjunctivae and EOM are normal.   Neck: Normal range of motion. Neck supple.   Cardiovascular: Normal rate, regular rhythm and normal heart sounds.    Respiratory: Effort normal and breath sounds normal. No respiratory distress. No wheezes.   GI: Soft. Bowel sounds are normal.No distension. There is no tenderness.   Musculoskeletal: Normal range of motion. No edema or deformity.   Neurological:  Cranial Nerves: I. No anosmia. II: No visual disturbance. III, IV VI: EOMI, PERRLA. V: Corneal reflext intact, no abnormal sensations. VII: No facial palsy, or altered sensation. VIII: Hearing intact, balance intact. IX: Intact ah reflex. X: Normal phonation, swallowing. XI: Normal shrug and head movement. XII: Intact tongue movements  Coordination normal. No lateralizing signs.  Skin: Skin is warm and dry. No rash noted. No erythema.     MENTAL STATUS EXAM:   Hygiene:   fair  Cooperation:  Cooperative  Eye Contact:  Good  Psychomotor Behavior:  Aggitated  Affect:  Appropriate  Hopelessness: 5  Speech:  Normal  Linear  Thought Content:  Normal  Suicidal:  None  Homicidal:  None  Hallucinations:  None  Delusion:  None  Memory:  Intact  Orientation:  Person, Place, Time, and Situation  Reliability:  fair  Insight:  Fair  Judgement:  Poor  Impulse Control:  Poor      Imaging Results (Last 24 Hours)       ** No results found for the last 24 hours. **             ECG/EMG Results (most recent)       None             Lab Results   Component Value Date    GLUCOSE 98 12/08/2024    BUN 19 12/08/2024    CREATININE 0.83 12/08/2024    BCR 22.9 12/08/2024    CO2 26.8 12/08/2024    CALCIUM 9.7 12/08/2024    ALBUMIN 4.5 12/08/2024    AST 33 12/08/2024    ALT 32 12/08/2024       Lab Results   Component Value Date    WBC 8.75 12/08/2024    HGB  14.1 12/08/2024    HCT 43.2 12/08/2024    MCV 94.7 12/08/2024     12/08/2024       Pain Management Panel          Latest Ref Rng & Units 12/8/2024   Pain Management Panel   Amphetamine, Urine Qual Negative Negative    Barbiturates Screen, Urine Negative Negative    Benzodiazepine Screen, Urine Negative Negative    Buprenorphine, Screen, Urine Negative Negative    Cocaine Screen, Urine Negative Negative    Fentanyl, Urine Negative Negative    Methadone Screen , Urine Negative Negative    Methamphetamine, Ur Negative Negative       Details                   Brief Urine Lab Results  (Last result in the past 365 days)        Color   Clarity   Blood   Leuk Est   Nitrite   Protein   CREAT   Urine HCG        12/08/24 1930 Yellow   Cloudy   Negative   Negative   Negative   Trace                          ASSESSMENT & PLAN:        Opioid use disorder, severe, dependence  -The patient is not experiencing active withdrawals and he has already weaned himself down to 1 mg of Suboxone. He agreed to go to rehab treatment as active detox is not indicated at this time.     Hospital bed: No          Written by Abby Benavidez acting as scribe for Dr.Mazhar Dee signature on this note affirms that the note adequately documents the care provided.   This note was generated using a scribe,   Abby Benavidez MA  12/09/24  9:58 AM EST    I, Maryellen Dee MD, personally performed the services described in this documentation as scribed by the above named individual in my presence, and it is both accurate and complete.